# Patient Record
Sex: MALE | Race: AMERICAN INDIAN OR ALASKA NATIVE
[De-identification: names, ages, dates, MRNs, and addresses within clinical notes are randomized per-mention and may not be internally consistent; named-entity substitution may affect disease eponyms.]

---

## 2018-03-22 ENCOUNTER — HOSPITAL ENCOUNTER (EMERGENCY)
Dept: HOSPITAL 5 - ED | Age: 28
LOS: 1 days | Discharge: HOME | End: 2018-03-23
Payer: SELF-PAY
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
## 2018-03-22 DIAGNOSIS — L03.312: ICD-10-CM

## 2018-03-22 DIAGNOSIS — Z88.0: ICD-10-CM
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
## 2018-03-22 DIAGNOSIS — S20.469A: Primary | ICD-10-CM
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
## 2018-03-22 LAB
BASOPHILS # (AUTO): 0.1 K/MM3 (ref 0–0.1)
BASOPHILS NFR BLD AUTO: 0.5 % (ref 0–1.8)
BUN SERPL-MCNC: 19 MG/DL (ref 9–20)
BUN/CREAT SERPL: 15 %
CALCIUM SERPL-MCNC: 8.9 MG/DL (ref 8.4–10.2)
EOSINOPHIL # BLD AUTO: 0.3 K/MM3 (ref 0–0.4)
EOSINOPHIL NFR BLD AUTO: 3.1 % (ref 0–4.3)
HCT VFR BLD CALC: 45.1 % (ref 35.5–45.6)
HEMOLYSIS INDEX: 15
HGB BLD-MCNC: 14.9 GM/DL (ref 11.8–15.2)
LYMPHOCYTES # BLD AUTO: 2.1 K/MM3 (ref 1.2–5.4)
LYMPHOCYTES NFR BLD AUTO: 20.9 % (ref 13.4–35)
MCH RBC QN AUTO: 33 PG (ref 28–32)
MCHC RBC AUTO-ENTMCNC: 33 % (ref 32–34)
MCV RBC AUTO: 99 FL (ref 84–94)
MONOCYTES # (AUTO): 0.5 K/MM3 (ref 0–0.8)
MONOCYTES % (AUTO): 5.2 % (ref 0–7.3)
PLATELET # BLD: 221 K/MM3 (ref 140–440)
RBC # BLD AUTO: 4.54 M/MM3 (ref 3.65–5.03)

## 2018-03-22 PROCEDURE — 90715 TDAP VACCINE 7 YRS/> IM: CPT

## 2018-03-22 PROCEDURE — 85025 COMPLETE CBC W/AUTO DIFF WBC: CPT

## 2018-03-22 PROCEDURE — 90471 IMMUNIZATION ADMIN: CPT

## 2018-03-22 PROCEDURE — 36415 COLL VENOUS BLD VENIPUNCTURE: CPT

## 2018-03-22 PROCEDURE — 80048 BASIC METABOLIC PNL TOTAL CA: CPT

## 2018-03-23 VITALS — SYSTOLIC BLOOD PRESSURE: 110 MMHG | DIASTOLIC BLOOD PRESSURE: 82 MMHG

## 2018-03-23 NOTE — EMERGENCY DEPARTMENT REPORT
- General


Chief complaint: Skin/Abscess/Foreign Body


Stated complaint: SPIDER BITE


Time Seen by Provider: 03/23/18 01:54


Source: patient


Mode of arrival: Ambulatory


Limitations: No Limitations





- History of Present Illness


Initial comments: 





This is a 27-year-old male that presents with a insect bite joe with redness 

x3 days. Patient stated something bite him and has redness and pain in the 

area. Patient denies seeing what it was. Patient denies any fever, chills, 

headache, stiff neck, bodyaches, numbness, tingling, chest pain, shortness of 

breathe, abdominal pain.  She denies any pronator drainage.  Patient denies any 

swelling.  Patient denies any past medical history.  Allergies include 

penicillin.


MD complaint: insect bite/sting


-: days(s) (3)


Tetanus Up to Date: no


Location: back


Severity: mild


Severity scale (0 -10): 8


Quality: aching


Consistency: constant


Improves with: none


Worsens with: none


Context: none


Associated symptoms: denies other symptoms


Treatments Prior to Arrival: none





- Related Data


 Previous Rx's











 Medication  Instructions  Recorded  Last Taken  Type


 


HYDROcodone/ACETAMINOPHEN [Norco 1 each PO TID PRN #15 tablet 02/15/15 Unknown 

Rx





7.5-325 mg TAB]    


 


Ibuprofen [Motrin] 800 mg PO Q8H #30 tablet 02/15/15 Unknown Rx


 


HYDROcodone/APAP 7.5-325 [Norco 1 each PO Q8HR PRN #21 tablet 12/16/15 Unknown 

Rx





7.5/325]    


 


Ibuprofen [Motrin] 600 mg PO Q8H PRN #30 tablet 03/23/18 Unknown Rx


 


Sulfamethoxazole/Trimethoprim 1 each PO BID #14 tablet 03/23/18 Unknown Rx





[Bactrim DS TAB]    











 Allergies











Allergy/AdvReac Type Severity Reaction Status Date / Time


 


Penicillins Allergy  Rash Verified 02/15/15 10:18














Abscess Boil HPI





- HPI


Chief Complaint: Skin/Abscess/Foreign Body


Stated Complaint: SPIDER BITE


Time Seen by Provider: 03/23/18 01:54


Home Medications: 


 Previous Rx's











 Medication  Instructions  Recorded  Last Taken  Type


 


HYDROcodone/ACETAMINOPHEN [Norco 1 each PO TID PRN #15 tablet 02/15/15 Unknown 

Rx





7.5-325 mg TAB]    


 


Ibuprofen [Motrin] 800 mg PO Q8H #30 tablet 02/15/15 Unknown Rx


 


HYDROcodone/APAP 7.5-325 [Norco 1 each PO Q8HR PRN #21 tablet 12/16/15 Unknown 

Rx





7.5/325]    


 


Ibuprofen [Motrin] 600 mg PO Q8H PRN #30 tablet 03/23/18 Unknown Rx


 


Sulfamethoxazole/Trimethoprim 1 each PO BID #14 tablet 03/23/18 Unknown Rx





[Bactrim DS TAB]    











Allergies/Adverse Reactions: 


 Allergies











Allergy/AdvReac Type Severity Reaction Status Date / Time


 


Penicillins Allergy  Rash Verified 02/15/15 10:18














ED Review of Systems


ROS: 


Stated complaint: SPIDER BITE


Other details as noted in HPI





Constitutional: denies: chills, fever


Eyes: denies: eye pain, eye discharge, vision change


ENT: denies: ear pain, throat pain


Respiratory: denies: cough, shortness of breath, wheezing


Cardiovascular: denies: chest pain, palpitations


Endocrine: no symptoms reported


Gastrointestinal: denies: abdominal pain, nausea, diarrhea


Genitourinary: denies: urgency, dysuria


Musculoskeletal: denies: back pain, joint swelling, arthralgia


Skin: denies: rash, lesions


Neurological: denies: headache, weakness, paresthesias


Psychiatric: denies: anxiety, depression


Hematological/Lymphatic: denies: easy bleeding, easy bruising





ED Past Medical Hx





- Past Medical History


Previous Medical History?: Yes


Additional medical history: bronchitis





- Surgical History


Past Surgical History?: No





- Social History


Smoking Status: Former Smoker


Substance Use Type: None





- Medications


Home Medications: 


 Home Medications











 Medication  Instructions  Recorded  Confirmed  Last Taken  Type


 


HYDROcodone/ACETAMINOPHEN [Norco 1 each PO TID PRN #15 tablet 02/15/15  Unknown 

Rx





7.5-325 mg TAB]     


 


Ibuprofen [Motrin] 800 mg PO Q8H #30 tablet 02/15/15  Unknown Rx


 


HYDROcodone/APAP 7.5-325 [Norco 1 each PO Q8HR PRN #21 tablet 12/16/15  Unknown 

Rx





7.5/325]     


 


Ibuprofen [Motrin] 600 mg PO Q8H PRN #30 tablet 03/23/18  Unknown Rx


 


Sulfamethoxazole/Trimethoprim 1 each PO BID #14 tablet 03/23/18  Unknown Rx





[Bactrim DS TAB]     














ED Physical Exam





- General


Limitations: No Limitations


General appearance: alert, in no apparent distress





- Head


Head exam: Present: atraumatic, normocephalic





- Eye


Eye exam: Present: normal appearance





- ENT


ENT exam: Present: normal exam, mucous membranes moist





- Neck


Neck exam: Present: normal inspection, full ROM.  Absent: tenderness





- Respiratory


Respiratory exam: Present: normal lung sounds bilaterally.  Absent: respiratory 

distress, wheezes, rales, rhonchi, stridor, chest wall tenderness, accessory 

muscle use, decreased breath sounds, prolonged expiratory





- Cardiovascular


Cardiovascular Exam: Present: regular rate, normal rhythm, normal heart sounds.

  Absent: bradycardia, tachycardia, irregular rhythm, systolic murmur, 

diastolic murmur, rubs, gallop





- GI/Abdominal


GI/Abdominal exam: Present: soft, normal bowel sounds





- Rectal


Rectal exam: Present: deferred





- Extremities Exam


Extremities exam: Present: normal inspection, full ROM, normal capillary refill





- Back Exam


Back exam: Present: normal inspection, full ROM, other (2 cm circular erythema.

  No induration or fluctuance noted.  Slight tenderness to touch.).  Absent: 

tenderness





- Neurological Exam


Neurological exam: Present: alert, oriented X3, normal gait





- Psychiatric


Psychiatric exam: Present: normal affect, normal mood





- Skin


Skin exam: Present: warm, dry, intact, normal color.  Absent: rash





ED Course


 Vital Signs











  03/22/18





  21:39


 


Temperature 98.9 F


 


Pulse Rate 85


 


Respiratory 16





Rate 


 


Blood Pressure 106/60


 


O2 Sat by Pulse 97





Oximetry 














- Reevaluation(s)


Reevaluation #1: 





03/23/18 02:04


Patient is speaking in full sentences with no signs of distress noted.





ED Medical Decision Making





- Lab Data


Result diagrams: 


 03/22/18 21:47





 03/22/18 21:47





- Medical Decision Making





This is a 19-year-old male that presents with cellulitis versus insect bite.  

Patient is stable and was examined by me.  Area has slight erythema with 

puncture wound.  There is no induration or fluctuance noted.  Slight 

tenderness. Vital signs and labs within normal limits. Patient is discharged 

with Bactrim.  Patient received tetanus booster in the ED.  Patient was 

instructed to Follow-up with a primary care doctor in 3-5 days or if symptoms 

worsen and continue return to emergency room as soon as possible.  At time of 

discharge, the patient does not seem toxic or ill in appearance.  No acute 

signs of distress noted.  Patient agrees to discharge treatment plan of care.  

No further questions noted by the patient.


Critical care attestation.: 


If time is entered above; I have spent that time in minutes in the direct care 

of this critically ill patient, excluding procedure time.








ED Disposition


Clinical Impression: 


Cellulitis


Qualifiers:


 Site of cellulitis: unspecified site Qualified Code(s): L03.90 - Cellulitis, 

unspecified





Insect bite


Qualifiers:


 Encounter type: initial encounter Qualified Code(s): W57.XXXA - Bitten or 

stung by nonvenomous insect and other nonvenomous arthropods, initial encounter





Disposition: DC-01 TO HOME OR SELFCARE


Is pt being admited?: No


Does the pt Need Aspirin: No


Condition: Stable


Instructions:  Cellulitis (ED), Insect Bite or Sting (ED)


Additional Instructions: 


Follow-up with a primary care doctor in 3-5 days or if symptoms worsen and 

continue return to emergency room as soon as possible. 


Prescriptions: 


Ibuprofen [Motrin] 600 mg PO Q8H PRN #30 tablet


 PRN Reason: Pain


Sulfamethoxazole/Trimethoprim [Bactrim DS TAB] 1 each PO BID #14 tablet


Referrals: 


SALTY BECKER MD [Primary Care Provider] - 3-5 Days


PRIMARY CARE,MD [Referring] - 3-5 Days


[facility] [Outside] - 3-5 Days


[facility] [Outside] - 3-5 Days


Forms:  Work/School Release Form(ED)